# Patient Record
Sex: FEMALE | Employment: UNEMPLOYED
[De-identification: names, ages, dates, MRNs, and addresses within clinical notes are randomized per-mention and may not be internally consistent; named-entity substitution may affect disease eponyms.]

---

## 2022-07-30 ENCOUNTER — TELEPHONE ENCOUNTER (OUTPATIENT)
Age: 47
End: 2022-07-30

## 2022-07-31 ENCOUNTER — TELEPHONE ENCOUNTER (OUTPATIENT)
Age: 47
End: 2022-07-31

## 2023-04-26 ENCOUNTER — WEB ENCOUNTER (OUTPATIENT)
Dept: URBAN - METROPOLITAN AREA CLINIC 63 | Facility: CLINIC | Age: 48
End: 2023-04-26

## 2023-04-26 PROBLEM — 428283002: Status: ACTIVE | Noted: 2023-04-26

## 2023-04-27 ENCOUNTER — LAB OUTSIDE AN ENCOUNTER (OUTPATIENT)
Dept: URBAN - METROPOLITAN AREA CLINIC 63 | Facility: CLINIC | Age: 48
End: 2023-04-27

## 2023-04-27 ENCOUNTER — OFFICE VISIT (OUTPATIENT)
Dept: URBAN - METROPOLITAN AREA CLINIC 63 | Facility: CLINIC | Age: 48
End: 2023-04-27
Payer: COMMERCIAL

## 2023-04-27 VITALS
TEMPERATURE: 97 F | HEIGHT: 68 IN | OXYGEN SATURATION: 99 % | SYSTOLIC BLOOD PRESSURE: 140 MMHG | WEIGHT: 273.4 LBS | BODY MASS INDEX: 41.43 KG/M2 | DIASTOLIC BLOOD PRESSURE: 100 MMHG | HEART RATE: 83 BPM

## 2023-04-27 DIAGNOSIS — R10.9 LEFT FLANK PAIN: ICD-10-CM

## 2023-04-27 DIAGNOSIS — K62.5 RECTAL BLEEDING: ICD-10-CM

## 2023-04-27 DIAGNOSIS — Z86.010 PERSONAL HISTORY OF COLONIC POLYPS: ICD-10-CM

## 2023-04-27 PROCEDURE — 99204 OFFICE O/P NEW MOD 45 MIN: CPT | Performed by: PHYSICIAN ASSISTANT

## 2023-04-27 RX ORDER — POLYETHYLENE GLYCOL 3350, SODIUM CHLORIDE, SODIUM BICARBONATE, POTASSIUM CHLORIDE 420; 11.2; 5.72; 1.48 G/4L; G/4L; G/4L; G/4L
AS DIRECTED POWDER, FOR SOLUTION ORAL ONCE
Qty: 4000 | Refills: 0 | OUTPATIENT
Start: 2023-04-27 | End: 2023-04-28

## 2023-04-27 RX ORDER — ONDANSETRON HYDROCHLORIDE 4 MG/1
1 TABLET TABLET, FILM COATED ORAL
Qty: 2 | Refills: 0 | OUTPATIENT
Start: 2023-04-27

## 2023-04-27 RX ORDER — L. ACIDOPHILUS/L. RHAMNOSUS 15B CELL
AS DIRECTED CAPSULE ORAL
Status: ACTIVE | COMMUNITY

## 2023-04-27 RX ORDER — TIZANIDINE 2 MG/1
1 TABLET AS NEEDED TABLET ORAL THREE TIMES A DAY
Status: ACTIVE | COMMUNITY

## 2023-04-27 RX ORDER — DICYCLOMINE HYDROCHLORIDE 10 MG/1
2 CAPSULES CAPSULE ORAL THREE TIMES A DAY
Status: ACTIVE | COMMUNITY

## 2023-04-27 RX ORDER — IBUPROFEN 600 MG/1
1 TABLET WITH FOOD OR MILK AS NEEDED TABLET, FILM COATED ORAL THREE TIMES A DAY
Status: ACTIVE | COMMUNITY

## 2023-04-27 RX ORDER — MONTELUKAST 10 MG/1
1 TABLET TABLET, FILM COATED ORAL ONCE A DAY
Status: ACTIVE | COMMUNITY

## 2023-04-27 NOTE — HPI-TODAY'S VISIT:
Jeannette is a pleasant 47-year-old female who presents today for evaluation of a surveillance colonoscopy and abdominal pain  Labs dated 3/10/2023 showed a normal hemoglobin.  Normal platelets.  Normal LFTs.  Normal renal function.  TSH normal.  Vitamin B12 lower limit of normal.  Ferritin normal.  Iron saturation 13%.  Normal total iron.  Underwent colonoscopy in October 2013 with Dr. Peterson.  7 mm polyp removed from the sigmoid colon.  Internal hemorrhoids.  Exam was otherwise normal.  Polyp consistent with tubular adenoma.  No significant inflammation histologically identified on random colon biopsies  She notes left sided flank pain/back pain since Dec 2022. Discomfort improves with a bowel movement. Non related to food. Related to position. Notes 1-2 daily bowel movements with occasional alternating bowel habits. Denies nausea, vomiting, heartburn, reflux, dysphagia, odynophagia, hematemesis, coffee ground emesis, abnormal weight loss or melena. Occasional BRBPR when wiping. Denies family history of colon cancer or colon polyps. No other GI complaints at this time

## 2023-05-04 ENCOUNTER — LAB OUTSIDE AN ENCOUNTER (OUTPATIENT)
Dept: URBAN - METROPOLITAN AREA CLINIC 63 | Facility: CLINIC | Age: 48
End: 2023-05-04

## 2023-05-09 ENCOUNTER — OFFICE VISIT (OUTPATIENT)
Dept: URBAN - METROPOLITAN AREA SURGERY CENTER 4 | Facility: SURGERY CENTER | Age: 48
End: 2023-05-09
Payer: COMMERCIAL

## 2023-05-09 ENCOUNTER — CLAIMS CREATED FROM THE CLAIM WINDOW (OUTPATIENT)
Dept: URBAN - METROPOLITAN AREA CLINIC 4 | Facility: CLINIC | Age: 48
End: 2023-05-09
Payer: COMMERCIAL

## 2023-05-09 VITALS — HEIGHT: 68 IN

## 2023-05-09 DIAGNOSIS — Z86.010 ADENOMAS PERSONAL HISTORY OF COLONIC POLYPS: ICD-10-CM

## 2023-05-09 DIAGNOSIS — K64.8 EXTERNAL HEMORRHOIDS: ICD-10-CM

## 2023-05-09 DIAGNOSIS — D12.2 BENIGN NEOPLASM OF ASCENDING COLON: ICD-10-CM

## 2023-05-09 DIAGNOSIS — K63.5 BENIGN COLON POLYP: ICD-10-CM

## 2023-05-09 PROCEDURE — 45380 COLONOSCOPY AND BIOPSY: CPT | Performed by: INTERNAL MEDICINE

## 2023-05-09 PROCEDURE — 88305 TISSUE EXAM BY PATHOLOGIST: CPT | Performed by: PATHOLOGY

## 2023-05-09 RX ORDER — TIZANIDINE 2 MG/1
1 TABLET AS NEEDED TABLET ORAL THREE TIMES A DAY
Status: ACTIVE | COMMUNITY

## 2023-05-09 RX ORDER — DICYCLOMINE HYDROCHLORIDE 10 MG/1
2 CAPSULES CAPSULE ORAL THREE TIMES A DAY
Status: ACTIVE | COMMUNITY

## 2023-05-09 RX ORDER — IBUPROFEN 600 MG/1
1 TABLET WITH FOOD OR MILK AS NEEDED TABLET, FILM COATED ORAL THREE TIMES A DAY
Status: ACTIVE | COMMUNITY

## 2023-05-09 RX ORDER — ONDANSETRON HYDROCHLORIDE 4 MG/1
1 TABLET TABLET, FILM COATED ORAL
Qty: 2 | Refills: 0 | Status: ACTIVE | COMMUNITY
Start: 2023-04-27

## 2023-05-09 RX ORDER — MONTELUKAST 10 MG/1
1 TABLET TABLET, FILM COATED ORAL ONCE A DAY
Status: ACTIVE | COMMUNITY

## 2023-05-09 RX ORDER — L. ACIDOPHILUS/L. RHAMNOSUS 15B CELL
AS DIRECTED CAPSULE ORAL
Status: ACTIVE | COMMUNITY

## 2023-05-26 ENCOUNTER — DASHBOARD ENCOUNTERS (OUTPATIENT)
Age: 48
End: 2023-05-26

## 2023-05-31 ENCOUNTER — OFFICE VISIT (OUTPATIENT)
Dept: URBAN - METROPOLITAN AREA CLINIC 63 | Facility: CLINIC | Age: 48
End: 2023-05-31

## 2023-05-31 RX ORDER — L. ACIDOPHILUS/L. RHAMNOSUS 15B CELL
AS DIRECTED CAPSULE ORAL
COMMUNITY

## 2023-05-31 RX ORDER — IBUPROFEN 600 MG/1
1 TABLET WITH FOOD OR MILK AS NEEDED TABLET, FILM COATED ORAL THREE TIMES A DAY
COMMUNITY

## 2023-05-31 RX ORDER — MONTELUKAST 10 MG/1
1 TABLET TABLET, FILM COATED ORAL ONCE A DAY
COMMUNITY

## 2023-05-31 RX ORDER — TIZANIDINE 2 MG/1
1 TABLET AS NEEDED TABLET ORAL THREE TIMES A DAY
COMMUNITY

## 2023-05-31 RX ORDER — DICYCLOMINE HYDROCHLORIDE 10 MG/1
2 CAPSULES CAPSULE ORAL THREE TIMES A DAY
COMMUNITY

## 2023-05-31 NOTE — HPI-TODAY'S VISIT:
Jeannette is a pleasant 48-year-old female who presents today for a procedure follow up.   Colonoscopy dated 5/9/2023 showed a diminutive polyp removed from the distal ascending colon.  Diminutive polyp removed from the mid descending colon.  Diminutive polyp removed in the mid sigmoid colon.  Nonbleeding internal hemorrhoids present upon retroflexion.  Normal ileum.  Adequate prep. All polyps consistent with tubular adenomas  Labs dated 3/10/2023 showed a normal hemoglobin.  Normal platelets.  Normal LFTs.  Normal renal function.  TSH normal.  Vitamin B12 lower limit of normal.  Ferritin normal.  Iron saturation 13%.  Normal total iron.  No issues after procedure. Last visit noted left sided flank pain/back pain since Dec 2022. Discomfort improves with a bowel movement. Non related to food. Related to position. CT ordered last visit not completed. Notes 1-2 daily bowel movements with occasional alternating bowel habits. Denies nausea, vomiting, heartburn, reflux, dysphagia, odynophagia, hematemesis, coffee ground emesis, abnormal weight loss or melena. Occasional BRBPR when wiping. Denies family history of colon cancer or colon polyps. No other GI complaints at this time